# Patient Record
Sex: FEMALE | Race: WHITE | NOT HISPANIC OR LATINO
[De-identification: names, ages, dates, MRNs, and addresses within clinical notes are randomized per-mention and may not be internally consistent; named-entity substitution may affect disease eponyms.]

---

## 2024-05-20 ENCOUNTER — APPOINTMENT (OUTPATIENT)
Dept: ORTHOPEDIC SURGERY | Facility: CLINIC | Age: 54
End: 2024-05-20

## 2024-05-20 ENCOUNTER — APPOINTMENT (OUTPATIENT)
Dept: ORTHOPEDIC SURGERY | Facility: CLINIC | Age: 54
End: 2024-05-20
Payer: COMMERCIAL

## 2024-05-20 VITALS — BODY MASS INDEX: 22.97 KG/M2 | WEIGHT: 117 LBS | HEIGHT: 60 IN

## 2024-05-20 DIAGNOSIS — Z78.9 OTHER SPECIFIED HEALTH STATUS: ICD-10-CM

## 2024-05-20 PROBLEM — Z00.00 ENCOUNTER FOR PREVENTIVE HEALTH EXAMINATION: Status: ACTIVE | Noted: 2024-05-20

## 2024-05-20 PROCEDURE — 99203 OFFICE O/P NEW LOW 30 MIN: CPT | Mod: 25

## 2024-05-20 PROCEDURE — 20552 NJX 1/MLT TRIGGER POINT 1/2: CPT

## 2024-05-20 PROCEDURE — 72050 X-RAY EXAM NECK SPINE 4/5VWS: CPT

## 2024-05-20 NOTE — IMAGING
[Disc space narrowing] : Disc space narrowing [No spinal deformity, fracture, lytic lesion, or marked single level collapse] : No spinal deformity, fracture, lytic lesion, or marked single level collapse [de-identified] :  C Spine Inspection: No defects or deformities Palpation: Tenderness over midline, paracervical musculature and b/l trapezius muscles L>R ROM: diminished in all planes, baldemar with ROm to the L Strength: 5/5 b/l deltoid, biceps, triceps, wrist flexors, wrist extensors, abductors Neuro: Sensation I LT Negative Cruz's b/l Negative Spurling

## 2024-05-20 NOTE — ASSESSMENT
[FreeTextEntry1] : 53 y/o F with neck pain into b/l shoulders R>L for 3 months. XRs with disc space narrowing, mostly noted at C5-6, no fx's or collapses. Exam consistent with trapezius spasm. No formal tx thus far.   - Gave L trap TPI today, tolerated well. - Start PT, provided rx.  - If symptoms persist or worsen despite this will consider MRI CS - F/up in 4-6 weeks if symptoms persist

## 2024-05-20 NOTE — HISTORY OF PRESENT ILLNESS
[Neck] : neck [6] : 6 [Radiating] : radiating [Tingling] : tingling [Constant] : constant [Nothing helps with pain getting better] : Nothing helps with pain getting better [de-identified] : 5/20/24: 53 y/o F presenting for evaluation of her neck. Reports pain started couple months ago, no injury. Pain radiates into b/l traps R>L, recently developed n/t in left shoulder. No pain down arms. ROM limited, especially to the left. Denies fine motor difficulties. Denies gait/balance issues.   pmhx: gastric lymphoma.  [] : no [FreeTextEntry5] : 55 yo F presenting with neck pain that started 02/2024 w/o injury. Radiates w/tingling into b/l shoulders.  [FreeTextEntry7] : b/l shoulders

## 2024-06-24 ENCOUNTER — APPOINTMENT (OUTPATIENT)
Dept: ORTHOPEDIC SURGERY | Facility: CLINIC | Age: 54
End: 2024-06-24

## 2024-06-24 DIAGNOSIS — M54.12 RADICULOPATHY, CERVICAL REGION: ICD-10-CM

## 2024-06-24 DIAGNOSIS — M62.838 OTHER MUSCLE SPASM: ICD-10-CM

## 2024-06-24 PROCEDURE — 99214 OFFICE O/P EST MOD 30 MIN: CPT | Mod: 25

## 2024-06-24 PROCEDURE — 20553 NJX 1/MLT TRIGGER POINTS 3/>: CPT

## 2024-06-24 PROCEDURE — 99204 OFFICE O/P NEW MOD 45 MIN: CPT | Mod: 25

## 2024-07-05 ENCOUNTER — RESULT REVIEW (OUTPATIENT)
Age: 54
End: 2024-07-05

## 2024-07-08 ENCOUNTER — APPOINTMENT (OUTPATIENT)
Dept: ORTHOPEDIC SURGERY | Facility: CLINIC | Age: 54
End: 2024-07-08

## 2024-08-15 ENCOUNTER — APPOINTMENT (OUTPATIENT)
Dept: ORTHOPEDIC SURGERY | Facility: CLINIC | Age: 54
End: 2024-08-15

## 2025-03-12 ENCOUNTER — TRANSCRIPTION ENCOUNTER (OUTPATIENT)
Age: 55
End: 2025-03-12